# Patient Record
Sex: MALE | Race: WHITE | NOT HISPANIC OR LATINO | Employment: FULL TIME | URBAN - METROPOLITAN AREA
[De-identification: names, ages, dates, MRNs, and addresses within clinical notes are randomized per-mention and may not be internally consistent; named-entity substitution may affect disease eponyms.]

---
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
## 2025-05-27 ENCOUNTER — OFFICE VISIT (OUTPATIENT)
Dept: URGENT CARE | Facility: CLINIC | Age: 25
End: 2025-05-27
Payer: COMMERCIAL

## 2025-05-27 VITALS
BODY MASS INDEX: 21.38 KG/M2 | SYSTOLIC BLOOD PRESSURE: 113 MMHG | RESPIRATION RATE: 18 BRPM | HEART RATE: 81 BPM | WEIGHT: 152.69 LBS | DIASTOLIC BLOOD PRESSURE: 56 MMHG | HEIGHT: 71 IN | TEMPERATURE: 98 F | OXYGEN SATURATION: 97 %

## 2025-05-27 DIAGNOSIS — S91.332A PUNCTURE WOUND OF LEFT FOOT, INITIAL ENCOUNTER: Primary | ICD-10-CM

## 2025-05-27 PROCEDURE — 90471 IMMUNIZATION ADMIN: CPT | Mod: S$GLB,,, | Performed by: NURSE PRACTITIONER

## 2025-05-27 PROCEDURE — 99214 OFFICE O/P EST MOD 30 MIN: CPT | Mod: 25,S$GLB,, | Performed by: NURSE PRACTITIONER

## 2025-05-27 PROCEDURE — 90714 TD VACC NO PRESV 7 YRS+ IM: CPT | Mod: S$GLB,,, | Performed by: NURSE PRACTITIONER

## 2025-05-27 RX ORDER — SULFAMETHOXAZOLE AND TRIMETHOPRIM 800; 160 MG/1; MG/1
1 TABLET ORAL 2 TIMES DAILY
Qty: 10 TABLET | Refills: 0 | Status: SHIPPED | OUTPATIENT
Start: 2025-05-27 | End: 2025-06-01

## 2025-05-27 RX ORDER — MUPIROCIN 20 MG/G
OINTMENT TOPICAL 2 TIMES DAILY
Qty: 44 G | Refills: 0 | Status: SHIPPED | OUTPATIENT
Start: 2025-05-27

## 2025-05-27 RX ORDER — ALBUTEROL SULFATE 90 UG/1
2 INHALANT RESPIRATORY (INHALATION) 4 TIMES DAILY
COMMUNITY
Start: 2025-05-10

## 2025-05-27 NOTE — PROGRESS NOTES
"Subjective:      Patient ID: Derek Avendano is a 24 y.o. male.    Vitals:  height is 5' 11" (1.803 m) and weight is 69.3 kg (152 lb 10.7 oz). His tympanic temperature is 97.5 °F (36.4 °C). His blood pressure is 113/56 (abnormal) and his pulse is 81. His respiration is 18 and oxygen saturation is 97%.     Chief Complaint: Laceration (Left foot )    24 yr old male presents to the Urgent Care with complaint of puncture wound to left foot x 1 day. Patient report catfish fin cut bottom of foot. Patient report pain with ambulation. Patient not UTD with tetanus.     Laceration   The incident occurred 12 to 24 hours ago. The laceration is located on the Left foot. The laceration is 1 cm in size. Injury mechanism: catfish betsy. The pain is at a severity of 8/10. The pain is moderate. The pain has been Constant since onset. It is unknown if a foreign body is present. His tetanus status is out of date.       Constitution: Negative for chills, sweating, fatigue and fever.   Musculoskeletal:  Positive for pain. Negative for joint pain and joint swelling.   Skin:  Positive for wound and puncture wound.      Objective:     Physical Exam   Constitutional: He appears well-developed. He is cooperative.  Non-toxic appearance. He does not appear ill. normal  Neck: Neck supple.   Cardiovascular: Normal rate, regular rhythm and normal pulses.   Pulses:       Radial pulses are 2+ on the right side and 2+ on the left side.   Pulmonary/Chest: Effort normal.   Abdominal: Normal appearance.   Musculoskeletal:      Right lower leg: No edema.      Left lower leg: No edema.      Left foot: Normal range of motion and normal capillary refill. Tenderness present. No bony tenderness, swelling, crepitus, deformity, laceration, bunion, Charcot foot, foot drop or prominent metatarsal heads.        Feet:    Neurological: He is alert.   Skin: Skin is warm, dry, intact and not diaphoretic. Capillary refill takes less than 2 seconds. not left " foot  Psychiatric: He experiences Normal attention. His speech is normal and behavior is normal. Mood and thought content normal.   Nursing note and vitals reviewed.      Assessment:     1. Puncture wound of left foot, initial encounter        Plan:    No lymphangitic spread visible and no fluid pockets or fluctuant abscess noted. No immune compromise, bullae, pain out of proportion, or rapid progression necrotizing fasciitis. No purulence, penetrating trauma, known MRSA colonization, IV drug use, or SIRS from MRSA infection.    Puncture wound of left foot, initial encounter  -     mupirocin (BACTROBAN) 2 % ointment; Apply topically 2 (two) times daily.  Dispense: 44 g; Refill: 0  -     Td (Tenivac) IM vaccine (>/= 6 yo)  -     sulfamethoxazole-trimethoprim 800-160mg (BACTRIM DS) 800-160 mg Tab; Take 1 tablet by mouth 2 (two) times daily. for 5 days  Dispense: 10 tablet; Refill: 0

## 2025-05-27 NOTE — PATIENT INSTRUCTIONS
Please keep your wound clean and dry.  Wash gently with soap and water and apply antibiotic ointment (bacitracin, neosporin, etc.) over the wound after washing. Please watch for signs of infection including: increased\spreading redness, swelling, pus-like discharge, or a fever greater than 100.4F. If you experience any of these, please contact your Primary Care Doctor or Return to the Urgent Care for a wound check.     If you were prescribed a narcotic or controlled medication, do not drive or operate heavy equipment or machinery while taking these medications.  You must understand that you've received an Urgent Care treatment only and that you may be released before all your medical problems are known or treated. You, the patient, will arrange for follow up care as instructed.  Follow up with your PCP or specialty clinic as directed within 2-5 days if not improved or as needed.  You can call [phone] to schedule an appointment with the appropriate provider.  If your condition worsens we recommend that you receive another evaluation at the emergency room immediately or contact your primary medical clinics after hours call service to discuss your concerns.  Please return here or go to the Emergency Department for any concerns or worsening of condition.

## 2025-07-15 ENCOUNTER — OCCUPATIONAL HEALTH (OUTPATIENT)
Dept: URGENT CARE | Facility: CLINIC | Age: 25
End: 2025-07-15

## 2025-07-15 DIAGNOSIS — Z00.00 ENCOUNTER FOR PHYSICAL EXAMINATION: Primary | ICD-10-CM

## 2025-07-15 LAB — BREATH ALCOHOL: 0
